# Patient Record
Sex: FEMALE | Race: OTHER | NOT HISPANIC OR LATINO | URBAN - METROPOLITAN AREA
[De-identification: names, ages, dates, MRNs, and addresses within clinical notes are randomized per-mention and may not be internally consistent; named-entity substitution may affect disease eponyms.]

---

## 2020-01-20 ENCOUNTER — EMERGENCY (EMERGENCY)
Facility: HOSPITAL | Age: 25
LOS: 0 days | Discharge: HOME | End: 2020-01-20
Attending: EMERGENCY MEDICINE | Admitting: EMERGENCY MEDICINE
Payer: OTHER GOVERNMENT

## 2020-01-20 VITALS
WEIGHT: 164.91 LBS | OXYGEN SATURATION: 100 % | HEART RATE: 75 BPM | SYSTOLIC BLOOD PRESSURE: 125 MMHG | RESPIRATION RATE: 20 BRPM | HEIGHT: 70 IN | TEMPERATURE: 98 F | DIASTOLIC BLOOD PRESSURE: 76 MMHG

## 2020-01-20 DIAGNOSIS — R05 COUGH: ICD-10-CM

## 2020-01-20 DIAGNOSIS — J06.9 ACUTE UPPER RESPIRATORY INFECTION, UNSPECIFIED: ICD-10-CM

## 2020-01-20 PROCEDURE — 99283 EMERGENCY DEPT VISIT LOW MDM: CPT

## 2020-01-20 PROCEDURE — 71046 X-RAY EXAM CHEST 2 VIEWS: CPT | Mod: 26

## 2020-01-20 PROCEDURE — 99053 MED SERV 10PM-8AM 24 HR FAC: CPT

## 2020-01-20 RX ADMIN — Medication 100 MILLIGRAM(S): at 07:32

## 2020-01-20 NOTE — ED ADULT NURSE NOTE - CHPI ED NUR SYMPTOMS NEG
no edema/no shortness of breath/no fever/no hemoptysis/no chest pain/no wheezing/no chills/no headache/no body aches/no diaphoresis

## 2020-01-20 NOTE — ED ADULT TRIAGE NOTE - CHIEF COMPLAINT QUOTE
Patient complaining of hoarse voice, dry cough associated with chest congestion x5days and worsening. Patient reports nasal congestion last week which has now progressed into respiratory congestion.

## 2020-01-20 NOTE — ED PROVIDER NOTE - CLINICAL SUMMARY MEDICAL DECISION MAKING FREE TEXT BOX
pt co cough, congestion, sick contacts with URI. no fever, cp, sob. pt in nad, etn nml neck sup cta, rrr, ab soft, tn, nd. no rash. URI, cxr neg, otc symptomatic tx,  dc home.

## 2020-01-20 NOTE — ED PROVIDER NOTE - OBJECTIVE STATEMENT
24y/o F w/ no pmh presents w/ 2 weeks of first dry now wet cough and congestion that is worsening. no fevers or chills. +sick contacts. no cp or sob.

## 2020-01-20 NOTE — ED ADULT NURSE NOTE - OBJECTIVE STATEMENT
Patient complaining of hoarse voice, dry cough associated with chest congestion x5days and worsening. Patient complaining of hoarse voice, dry cough associated with chest congestion x5days and worsening. Denies any chest pain, fever, chills, shortness of breath, abdominal pain, nausea, vomiting. Denies any productive sputum

## 2020-01-20 NOTE — ED PROVIDER NOTE - PATIENT PORTAL LINK FT
You can access the FollowMyHealth Patient Portal offered by [facility] by registering at the following website: [email or web address]. By joining [employer]’s FollowMyHealth portal, you will also be able to view your health information using other applications (apps) compatible with our system.

## 2020-01-20 NOTE — ED PROVIDER NOTE - PHYSICAL EXAMINATION
VITAL SIGNS: I have reviewed nursing notes and confirm.  CONSTITUTIONAL: Well-developed; well-nourished; in no acute distress. pt comfortable.  SKIN: skin exam is warm and dry, no acute rash.   HEAD: Normocephalic; atraumatic.  EYES:  EOM intact; conjunctiva and sclera clear.  ENT: No nasal discharge; airway clear. moist oral mucosa;   NECK: Supple; non tender.  CARD: S1, S2 normal; no murmurs, gallops, or rubs. Regular rate and rhythm. posterior tibial and radial pulses 2+  RESP: No wheezes, rales or rhonchi. cta b/l. no use of accessory muscles. no retractions  ABD: Normal bowel sounds; soft; non-distended; non-tender; no rebound.  EXT: Normal ROM. No  cyanosis or edema.  BACK: No cva tenderness  LYMPH: No acute cervical adenopathy.  NEURO: Alert, oriented, grossly unremarkable.    PSYCH: Cooperative, appropriate.

## 2020-11-27 ENCOUNTER — EMERGENCY (EMERGENCY)
Facility: HOSPITAL | Age: 25
LOS: 0 days | Discharge: HOME | End: 2020-11-27
Attending: EMERGENCY MEDICINE | Admitting: EMERGENCY MEDICINE
Payer: OTHER GOVERNMENT

## 2020-11-27 VITALS
TEMPERATURE: 99 F | RESPIRATION RATE: 18 BRPM | DIASTOLIC BLOOD PRESSURE: 73 MMHG | HEIGHT: 70 IN | WEIGHT: 169.98 LBS | HEART RATE: 79 BPM | SYSTOLIC BLOOD PRESSURE: 135 MMHG | OXYGEN SATURATION: 100 %

## 2020-11-27 DIAGNOSIS — M54.5 LOW BACK PAIN: ICD-10-CM

## 2020-11-27 PROBLEM — Z78.9 OTHER SPECIFIED HEALTH STATUS: Chronic | Status: ACTIVE | Noted: 2020-01-20

## 2020-11-27 PROCEDURE — 99284 EMERGENCY DEPT VISIT MOD MDM: CPT

## 2020-11-27 PROCEDURE — 72100 X-RAY EXAM L-S SPINE 2/3 VWS: CPT | Mod: 26

## 2020-11-27 RX ORDER — KETOROLAC TROMETHAMINE 30 MG/ML
30 SYRINGE (ML) INJECTION ONCE
Refills: 0 | Status: DISCONTINUED | OUTPATIENT
Start: 2020-11-27 | End: 2020-11-27

## 2020-11-27 RX ADMIN — Medication 30 MILLIGRAM(S): at 13:01

## 2020-11-27 NOTE — ED PROVIDER NOTE - PHYSICAL EXAMINATION
VITAL SIGNS: I have reviewed nursing notes and confirm.  CONSTITUTIONAL: Well-developed; well-nourished; in no acute distress.  SKIN: Skin exam is warm and dry, no acute rash.  HEAD: Normocephalic; atraumatic.  EYES: PERRL, EOM intact; conjunctiva and sclera clear.  ENT: No nasal discharge; airway clear.   NECK: Supple; non tender.  CARD: S1, S2 normal; no murmurs, gallops, or rubs. Regular rate and rhythm.  RESP: Clear to auscultation bilaterally. No wheezes, rales or rhonchi.  ABD: Normal bowel sounds; soft; non-distended; non-tender.   EXT/MSK: Normal ROM. No edema. +Mild tenderness to left paraspinal of lumbar region. No midline tenderness.   LYMPH: No acute cervical adenopathy.  NEURO: Alert, oriented. Grossly unremarkable. No focal deficits.  PSYCH: Cooperative, appropriate.

## 2020-11-27 NOTE — ED PROVIDER NOTE - CARE PROVIDER_API CALL
Gilmer Ewing  ORTHOPAEDIC SURGERY  [address] [place] [address]  Phone: [phone]  Fax: [phone]  Follow Up Time: 1-3 Days

## 2020-11-27 NOTE — ED PROVIDER NOTE - PROGRESS NOTE DETAILS
Patient reports improvement in pain, x-ray is negative for acute pathology.  Patient to be discharged from ED. Any available test results were discussed with patient and/or family. Verbal instructions given, including instructions to return to ED immediately for any new, worsening, or concerning symptoms. Patient endorsed understanding. Written discharge instructions additionally given, including follow-up plan.  Patient was given opportunity to ask questions. Pt given copies of xray results. Feeling better. Advised fu. Discussed strict return precautions.

## 2020-11-27 NOTE — ED PROVIDER NOTE - CLINICAL SUMMARY MEDICAL DECISION MAKING FREE TEXT BOX
Patient with back pain, no red flags, LS spine x-ray negative for acute pathology, stable for d/c home.  Has good follow up with her PMD.

## 2020-11-27 NOTE — ED PROVIDER NOTE - PATIENT PORTAL LINK FT
You can access the FollowMyHealth Patient Portal offered by [facility] by registering at the following website: [email or web address]. By joining [employer]’s FollowMyHealth portal, you will also be able to view your health information using other applications (apps) compatible with our system.

## 2020-11-27 NOTE — ED PROVIDER NOTE - OBJECTIVE STATEMENT
24 yo F with no pmhx presenting with sharp, lower back pain since early November. States pain radiates at time to lower leg. States pain is worse with movement. Reports pain started after doing a stretching exercise in early November. Pt states she has been taking flexeril and naproxen with mild relief. No trauma. No rash. No cp, sob, fever, chills, abdominal pain, nausea, vomiting, diarrhea, urinary symptoms, headache, dizziness, paresthesias, or weakness.

## 2020-11-27 NOTE — ED PROVIDER NOTE - ATTENDING CONTRIBUTION TO CARE
24 yo female without any significant PMH c/o left sided low back pain.  Started early November when doing "back stretching", got  better and then returned after moving something,  Pain is sharp, with some radiation to th lower leg, worse with movement . Denies any associated bowel or bladder problems, paresthesias, abdominal pain, fever, chills, weight loss, change in appetite, black or bloody stools or any other complaints,   [place] and [place], pain meds and a muscle relaxant, she went to urgent care clinic and got a dose of steroid a few days ago.  She comes today saying that her  doctor told her to come for "x-ray or MRI".  Patient is aware that we are not doing non-emergent MRIs in ED and she told me her doctor can refer her to get one outpatient.  Will give a dose of analgesia and get LS spine x-ray,  She is happy with the plan.   Well-appearing well-nourished, NAD, head AT/NC, PERRL, pink conjunctivae, supple neck without midline spine ttp, nml work of breathing, lungs CTA b/l, equal air entry, RRR, well-perfused extremities, distal pulses intact, abdomen soft, NT/ND, BS present in all quadrants, no midline spine or CVA ttp,  mild ttp of the left lumbar paraspinal muscles, skin nml color and temp, no leg edema or unilateral calf swelling, A&Ox3, no focal neuro deficits, nml mood and affect.

## 2020-11-27 NOTE — ED PROVIDER NOTE - NSFOLLOWUPCLINICS_GEN_ALL_ED_FT
[facility] ([facility])  Rehabilitation  [facility] [address] [place] [address]  Phone: [phone]  Fax:   Follow Up Time: 1-3 Days

## 2020-11-30 ENCOUNTER — OUTPATIENT (OUTPATIENT)
Dept: OUTPATIENT SERVICES | Facility: HOSPITAL | Age: 25
LOS: 1 days | Discharge: HOME | End: 2020-11-30
Payer: OTHER GOVERNMENT

## 2020-11-30 DIAGNOSIS — M54.5 LOW BACK PAIN: ICD-10-CM

## 2020-11-30 PROCEDURE — 72158 MRI LUMBAR SPINE W/O & W/DYE: CPT | Mod: 26
